# Patient Record
Sex: FEMALE | Race: WHITE | Employment: OTHER | URBAN - METROPOLITAN AREA
[De-identification: names, ages, dates, MRNs, and addresses within clinical notes are randomized per-mention and may not be internally consistent; named-entity substitution may affect disease eponyms.]

---

## 2017-02-28 ENCOUNTER — HOSPITAL ENCOUNTER (OUTPATIENT)
Dept: ULTRASOUND IMAGING | Age: 77
Discharge: HOME | End: 2017-02-28
Attending: FAMILY MEDICINE
Payer: MEDICARE

## 2017-02-28 ENCOUNTER — HOSPITAL ENCOUNTER (OUTPATIENT)
Dept: NUCLEAR MEDICINE | Age: 77
Discharge: HOME | End: 2017-02-28
Attending: FAMILY MEDICINE
Payer: MEDICARE

## 2017-02-28 VITALS — WEIGHT: 168.6 LBS | BODY MASS INDEX: 29.4 KG/M2

## 2017-02-28 DIAGNOSIS — K80.20 GALLSTONE: ICD-10-CM

## 2017-02-28 DIAGNOSIS — R10.9 ABDOMINAL PAIN: ICD-10-CM

## 2017-02-28 PROCEDURE — 78227 HEPATOBIL SYST IMAGE W/DRUG: CPT

## 2017-02-28 PROCEDURE — 74011000250 HC RX REV CODE- 250: Performed by: FAMILY MEDICINE

## 2017-02-28 PROCEDURE — 76705 ECHO EXAM OF ABDOMEN: CPT

## 2017-02-28 PROCEDURE — 74011250636 HC RX REV CODE- 250/636: Performed by: FAMILY MEDICINE

## 2017-02-28 RX ORDER — WATER FOR INJECTION,STERILE
VIAL (ML) INJECTION
Status: COMPLETED | OUTPATIENT
Start: 2017-02-28 | End: 2017-02-28

## 2017-02-28 RX ADMIN — SINCALIDE 1.53 MCG: 5 INJECTION, POWDER, LYOPHILIZED, FOR SOLUTION INTRAVENOUS at 11:20

## 2017-02-28 RX ADMIN — WATER 10 ML: 1 INJECTION INTRAMUSCULAR; INTRAVENOUS; SUBCUTANEOUS at 11:21

## 2017-04-24 ENCOUNTER — OFFICE VISIT (OUTPATIENT)
Dept: SURGERY | Age: 77
End: 2017-04-24

## 2017-04-24 VITALS
DIASTOLIC BLOOD PRESSURE: 68 MMHG | BODY MASS INDEX: 28.85 KG/M2 | OXYGEN SATURATION: 97 % | RESPIRATION RATE: 18 BRPM | TEMPERATURE: 98.5 F | HEIGHT: 64 IN | HEART RATE: 88 BPM | SYSTOLIC BLOOD PRESSURE: 140 MMHG | WEIGHT: 169 LBS

## 2017-04-24 DIAGNOSIS — K80.20 GALLSTONES: ICD-10-CM

## 2017-04-24 DIAGNOSIS — R10.11 RIGHT UPPER QUADRANT ABDOMINAL PAIN: Primary | ICD-10-CM

## 2017-04-24 NOTE — LETTER
4/24/2017 11:29 AM 
 
Patient:  Dana Collins YOB: 1940 Date of Visit: 4/24/2017 Jesse Montesinos MD 
[address] [place] [address],[address] [place] [id number] VIA Facsimile: [phone] Dear Jesse Montesinos MD, Thank you for referring Ms. Dana Collins to [place] [address] for evaluation and treatment. Below are the relevant portions of my assessment and plan of care. Thank you very much for your referral of Ms. Dana Collins. If you have questions, please do not hesitate to call me. I look forward to following Ms. Judi Phipps along with you and will keep you updated as to her progress. Sincerely, Amira Hyman MD

## 2017-04-24 NOTE — PATIENT INSTRUCTIONS
If you have any questions or concerns about today's appointment, the verbal and/or written instructions you were given for follow up care, please call our office at [phone].     [facility] - [address]    [phone] office  [phone]

## 2017-04-24 NOTE — PROGRESS NOTES
General Surgery Consult      Miles Patel  Admit date: (Not on file)    MRN: [id number]     : 1940     Age: 68 y.o. Attending Physician: Sharda Quintero MD, FACS      Subjective:     Sudha Lubin is a 68 y.o. female with a history of abdominal pain. She complains of right upper quadrant pain. The pain happened last night and it was associated with nausea and vomiting. She had a HIDA scan that showed EF of 87% and an U/S that showed gallstones. The patient  has not had jaundice, acholic stools or dark urine and has not had a history of pancreatitis or hepatitis. She never had surgery in the past.     Patient Active Problem List    Diagnosis Date Noted    Encounter for colonoscopy due to history of adenomatous colonic polyps 2015    Hypertension     Thyroid dysfunction     Hypothyroidism     Hematuria      Past Medical History:   Diagnosis Date    Hematuria     Hypertension     Hypothyroidism     Thyroid dysfunction       No past surgical history on file. Social History   Substance Use Topics    Smoking status: Never Smoker    Smokeless tobacco: Not on file    Alcohol use No      History   Smoking Status    Never Smoker   Smokeless Tobacco    Not on file     Family History   Problem Relation Age of Onset    No Known Problems Mother     No Known Problems Father       Current Outpatient Prescriptions   Medication Sig    levothyroxine (SYNTHROID) 75 mcg tablet Take  by mouth Daily (before breakfast).  atorvastatin (LIPITOR) 20 mg tablet Take  by mouth daily.  lisinopril (PRINIVIL, ZESTRIL) 10 mg tablet Take  by mouth daily.  LACTOBACILLUS ACIDOPHILUS (PROBIOTIC PO) Take  by mouth.  DOCOSAHEXANOIC ACID/EPA (FISH OIL PO) Take  by mouth.  melatonin 3 mg tablet Take  by mouth. No current facility-administered medications for this visit.        No Known Allergies     Review of Systems:  Constitutional: negative  Eyes: negative  Ears, Nose, Mouth, Throat, and Face: negative  Respiratory: negative  Cardiovascular: negative  Gastrointestinal: positive for nausea, vomiting and abdominal pain  Genitourinary:negative  Integument/Breast: negative  Hematologic/Lymphatic: negative  Musculoskeletal:negative  Neurological: negative  Behavioral/Psychiatric: negative    Objective:     Visit Vitals    /68 (BP 1 Location: Right arm, BP Patient Position: Sitting)    Pulse 88    Temp 98.5 °F (36.9 °C) (Oral)    Resp 18    Ht 5' 3.5\" (1.613 m)    Wt 76.7 kg (169 lb)    SpO2 97%    BMI 29.47 kg/m2       Physical Exam:      General:  in no apparent distress and well developed and well nourished   Eyes:  conjunctivae and sclerae normal, pupils equal, round, reactive to light   Throat & Neck: no erythema or exudates noted and neck supple and symmetrical; no palpable masses   Lungs:   clear to auscultation bilaterally   Heart:  Regular rate and rhythm   Abdomen:   rounded, soft, non tender except for right upper quadrant tenderenss with localized guarding (postive Goldsmith's sign), nondistended, no masses or organomegaly. Possible small umbilical hernia.     Extremities: extremities normal, atraumatic, no cyanosis or edema   Skin: Normal.         Imaging and Lab Review:     CBC:   Lab Results   Component Value Date/Time    WBC 3.7 06/28/2010 10:06 AM    RBC 5.03 06/28/2010 10:06 AM    HGB 15.3 06/28/2010 10:06 AM    HCT 46.7 06/28/2010 10:06 AM    PLATELET 649 94/54/1138 10:06 AM     BMP:   Lab Results   Component Value Date/Time    Glucose 109 06/28/2010 10:06 AM    Sodium 142 06/28/2010 10:06 AM    Potassium 4.3 06/28/2010 10:06 AM    Chloride 102 06/28/2010 10:06 AM    CO2 26 06/28/2010 10:06 AM    BUN 15 06/28/2010 10:06 AM    Creatinine 0.9 06/28/2010 10:06 AM    Calcium 10.2 06/28/2010 10:06 AM     CMP:  Lab Results   Component Value Date/Time    Glucose 109 06/28/2010 10:06 AM    Sodium 142 06/28/2010 10:06 AM    Potassium 4.3 06/28/2010 10:06 AM    Chloride 102 06/28/2010 10:06 AM    CO2 26 06/28/2010 10:06 AM    BUN 15 06/28/2010 10:06 AM    Creatinine 0.9 06/28/2010 10:06 AM    Calcium 10.2 06/28/2010 10:06 AM    Anion gap 14 06/28/2010 10:06 AM    BUN/Creatinine ratio 17 06/28/2010 10:06 AM    Alk. phosphatase 75 06/28/2010 10:06 AM    Protein, total 8.0 06/28/2010 10:06 AM    Albumin 4.6 06/28/2010 10:06 AM    Globulin 3.4 06/28/2010 10:06 AM    A-G Ratio 1.4 06/28/2010 10:06 AM       No results found for this or any previous visit (from the past 24 hour(s)). images and reports reviewed    Assessment:   Henrique Duran is a 68 y.o. female is presenting with abdominal pain and a picture of chronic cholecystitis. I explained the indications for robotic cholecystectomy as well as the alternatives. I discussed the potential risks, including but not limited to bleeding, wound infection, trocar injuries, bile duct injury and leak, and also the possible need for conversion to open procedure. I also explained the firefly technology and how it allow us to visualize the biliary tree to avoid bile duct injury or leak. She indicates that she understands the risks, but the patient wants to hold on the surgery because her son will be undergoing heart surgery this week. I explained to her the chance of acute cholecystitis and the risk of major complications. Plan:      Will schedule for robotic single-site cholecystectomy with firefly (ICG) technology for identification of the biliary tree once the patient is ready for the surgery  Follow up in 1 to 2 months     Please call me if you have any questions (cell phone: [phone])     Signed By: Iris Ramos MD     April 24, 2017

## 2017-04-24 NOTE — PROGRESS NOTES
Patient is a 68 y.o. female who presents for a surgical evaluation of RUQ abdominal pain, which she scores as a 10/10 today, & cholelithiasis.

## 2017-04-24 NOTE — MR AVS SNAPSHOT
Visit Information Date & Time Provider Department Dept. Phone Encounter #  
 4/24/2017 11:30 AM Mely Gaytan [address] [facility] [phone] [id number] Upcoming Health Maintenance Date Due DTaP/Tdap/Td series (1 - Tdap) 10/27/1961 ZOSTER VACCINE AGE 60> 10/27/2000 GLAUCOMA SCREENING Q2Y 10/27/2005 Pneumococcal 65+ Low/Medium Risk (1 of 2 - PCV13) 10/27/2005 MEDICARE YEARLY EXAM 10/27/2005 INFLUENZA AGE 9 TO ADULT 8/1/2016 Allergies as of 4/24/2017  Review Complete On: 4/24/2017 By: Marycruz Dougherty No Known Allergies Current Immunizations  Never Reviewed No immunizations on file. Not reviewed this visit Vitals BP Pulse Temp Resp Height(growth percentile) Weight(growth percentile) 140/68 (BP 1 Location: Right arm, BP Patient Position: Sitting) 88 98.5 °F (36.9 °C) (Oral) 18 5' 3.5\" (1.613 m) 169 lb (76.7 kg) SpO2 BMI OB Status Smoking Status 97% 29.47 kg/m2 Menopause Never Smoker BMI and BSA Data Body Mass Index Body Surface Area  
 29.47 kg/m 2 1.85 m 2 Preferred Pharmacy Pharmacy Name Phone [address] [place] [address] [phone] Your Updated Medication List  
  
   
This list is accurate as of: 4/24/17 11:55 AM.  Always use your most recent med list.  
  
  
  
  
 atorvastatin 20 mg tablet Commonly known as:  LIPITOR Take  by mouth daily. FISH OIL PO Take  by mouth.  
  
 levothyroxine 75 mcg tablet Commonly known as:  SYNTHROID Take  by mouth Daily (before breakfast). lisinopril 10 mg tablet Commonly known as:  Emi Feil Take  by mouth daily. melatonin 3 mg tablet Take  by mouth. PROBIOTIC PO Take  by mouth. Patient Instructions If you have any questions or concerns about today's appointment, the verbal and/or written instructions you were given for follow up care, please call our office at [phone]. [facility] - [place] [id number] [place] [address], [address] 
 
[phone] office [phone] Introducing [facility] & HEALTH SERVICES! [facility] introduces [employer] patient portal. Now you can access parts of your medical record, email your doctor's office, and request medication refills online. 1. In your internet browser, go to [email or web address] 2. Click on the First Time User? Click Here link in the Sign In box. You will see the New Member Sign Up page. 3. Enter your Fantoo Access Code exactly as it appears below. You will not need to use this code after youve completed the sign-up process. If you do not sign up before the expiration date, you must request a new code. · [employer] Access Code: [email or web address] Expires: 5/23/2017  9:57 AM 
 
4. Enter the last four digits of your Social Security Number (xxxx) and Date of Birth (mm/dd/yyyy) as indicated and click Submit. You will be taken to the next sign-up page. 5. Create a Fantoo ID. This will be your Fantoo login ID and cannot be changed, so think of one that is secure and easy to remember. 6. Create a [employer] password. You can change your password at any time. 7. Enter your Password Reset Question and Answer. This can be used at a later time if you forget your password. 8. Enter your e-mail address. You will receive e-mail notification when new information is available in [address] 9. Click Sign Up. You can now view and download portions of your medical record. 10. Click the Download Summary menu link to download a portable copy of your medical information. If you have questions, please visit the Frequently Asked Questions section of the [employer] website. Remember, [employer]oo is NOT to be used for urgent needs. For medical emergencies, dial 911. Now available from your iPhone and Android! Please provide this summary of care documentation to your next provider. Your primary care clinician is listed as JAYRO Meyers. If you have any questions after today's visit, please call [phone].

## 2017-04-26 ENCOUNTER — DOCUMENTATION ONLY (OUTPATIENT)
Dept: SURGERY | Age: 77
End: 2017-04-26

## 2017-04-26 ENCOUNTER — TELEPHONE (OUTPATIENT)
Dept: SURGERY | Age: 77
End: 2017-04-26

## 2017-04-26 NOTE — PROGRESS NOTES
.. PATIENT PRE AND POST OPERATIVE INSTRUCTIONS     [address] [place] [address]     Before Surgery Instructions:   1) You must have someone available to drive you to and from your procedure and stay with you for the first 24 hours. 2) It is very important that you have nothing to eat or drink after midnight the night before your surgery. This includes chewing gum or sucking on hard candy. Take only heart, blood pressure and cholesterol medications the morning of surgery with only a sip of water. 3) Please stop taking Plavix 10 days prior to your surgery. Stop taking Coumadin 5 days prior to your surgery. Stop taking all Aspirin or Aspirin containing products 7 days prior to your surgery. Stop taking Advil, Motrin, Aleve, and etc. 3 days prior to your surgery. 4) If you take any diabetic medications please consult with your primary care physician on how to take them on the day of your surgery  5) Please stop all Herbal products 2 weeks prior to your surgery. 6) Please arrive at the hospital 1 ½ hours prior to your surgery, unless you have been otherwise instructed. 7) Patients having an operation on their colon will be given a separate instruction sheet on their Bowel Prep. 8) For any pre-operative work up check in at the main entrance to [address] [place] [address], and then go to Patient Registration. These studies are done on a walk in basis they are open from 7:00am to 5:00pm Monday through Friday. 9) Please wash your surgical site the morning of your surgery with soap and water. 10) If you are of child bearing age you will have pregnancy test done the morning of your surgery as soon as you arrive. After Surgery Instructions: You will need to be seen in the office for a follow-up visit 7-14 days after your surgery. Please call after you have had the procedure to make this appointment. Unless otherwise instructed, you may remove your outer bandage and shower 48 hours after your surgery.   If you develop a fever greater than 101, have any significant drainage, bleeding, swelling and/or pus of the wound. Please call our office immediately. Surgery Date and Time:  (patient to call back)    Please check in at [facility], enter through the Emergency Room entrance and go up to the second floor. Please check in by       the day of your surgery. You may contact Francesco Ornelas with any questions at [phone].

## 2017-04-27 RX ORDER — HYDROCODONE BITARTRATE AND ACETAMINOPHEN 5; 325 MG/1; MG/1
TABLET ORAL
COMMUNITY

## 2017-05-01 ENCOUNTER — ANESTHESIA EVENT (OUTPATIENT)
Dept: SURGERY | Age: 77
End: 2017-05-01
Payer: MEDICARE

## 2017-05-02 ENCOUNTER — ANESTHESIA (OUTPATIENT)
Dept: SURGERY | Age: 77
End: 2017-05-02
Payer: MEDICARE

## 2017-05-02 ENCOUNTER — HOSPITAL ENCOUNTER (OUTPATIENT)
Age: 77
Setting detail: OUTPATIENT SURGERY
Discharge: HOME | End: 2017-05-02
Attending: SURGERY | Admitting: SURGERY
Payer: MEDICARE

## 2017-05-02 VITALS
BODY MASS INDEX: 26.63 KG/M2 | DIASTOLIC BLOOD PRESSURE: 60 MMHG | HEART RATE: 51 BPM | RESPIRATION RATE: 16 BRPM | SYSTOLIC BLOOD PRESSURE: 128 MMHG | WEIGHT: 156 LBS | OXYGEN SATURATION: 92 % | HEIGHT: 64 IN | TEMPERATURE: 97.9 F

## 2017-05-02 PROCEDURE — 74011000250 HC RX REV CODE- 250: Performed by: SURGERY

## 2017-05-02 PROCEDURE — 77030018842 HC SOL IRR SOD CL 9% BAXT -A: Performed by: SURGERY

## 2017-05-02 PROCEDURE — 76210000021 HC REC RM PH II 0.5 TO 1 HR: Performed by: SURGERY

## 2017-05-02 PROCEDURE — 77030011640 HC PAD GRND REM COVD -A: Performed by: SURGERY

## 2017-05-02 PROCEDURE — 74011250636 HC RX REV CODE- 250/636

## 2017-05-02 PROCEDURE — 76010000875 HC OR TIME 1.5 TO 2HR INTENSV - TIER 2: Performed by: SURGERY

## 2017-05-02 PROCEDURE — 74011250636 HC RX REV CODE- 250/636: Performed by: SURGERY

## 2017-05-02 PROCEDURE — 74011000250 HC RX REV CODE- 250

## 2017-05-02 PROCEDURE — 77030029216 HC PRT SGL SITE DAVINCI INTU -C: Performed by: SURGERY

## 2017-05-02 PROCEDURE — 77030035048 HC TRCR ENDOSC OPTCL COVD -B: Performed by: SURGERY

## 2017-05-02 PROCEDURE — 77030010939 HC CLP LIG TELE -B: Performed by: SURGERY

## 2017-05-02 PROCEDURE — 77030008477 HC STYL SATN SLP COVD -A: Performed by: ANESTHESIOLOGY

## 2017-05-02 PROCEDURE — 74011000272 HC RX REV CODE- 272: Performed by: SURGERY

## 2017-05-02 PROCEDURE — 77030002933 HC SUT MCRYL J&J -A: Performed by: SURGERY

## 2017-05-02 PROCEDURE — 74011250636 HC RX REV CODE- 250/636: Performed by: NURSE ANESTHETIST, CERTIFIED REGISTERED

## 2017-05-02 PROCEDURE — 77030008683 HC TU ET CUF COVD -A: Performed by: ANESTHESIOLOGY

## 2017-05-02 PROCEDURE — 77030032490 HC SLV COMPR SCD KNE COVD -B: Performed by: SURGERY

## 2017-05-02 PROCEDURE — 74011250637 HC RX REV CODE- 250/637: Performed by: NURSE ANESTHETIST, CERTIFIED REGISTERED

## 2017-05-02 PROCEDURE — 93005 ELECTROCARDIOGRAM TRACING: CPT

## 2017-05-02 PROCEDURE — 77030002966 HC SUT PDS J&J -A: Performed by: SURGERY

## 2017-05-02 PROCEDURE — 77030020703 HC SEAL CANN DISP INTU -B: Performed by: SURGERY

## 2017-05-02 PROCEDURE — 76060000034 HC ANESTHESIA 1.5 TO 2 HR: Performed by: SURGERY

## 2017-05-02 PROCEDURE — 77030008771 HC TU NG SALEM SUMP -A: Performed by: ANESTHESIOLOGY

## 2017-05-02 PROCEDURE — 88304 TISSUE EXAM BY PATHOLOGIST: CPT | Performed by: SURGERY

## 2017-05-02 PROCEDURE — 77030011267 HC ELECTRD BLD COVD -A: Performed by: SURGERY

## 2017-05-02 PROCEDURE — 77030020263 HC SOL INJ SOD CL0.9% LFCR 1000ML: Performed by: SURGERY

## 2017-05-02 PROCEDURE — 82947 ASSAY GLUCOSE BLOOD QUANT: CPT

## 2017-05-02 PROCEDURE — 76210000016 HC OR PH I REC 1 TO 1.5 HR: Performed by: SURGERY

## 2017-05-02 RX ORDER — LIDOCAINE HYDROCHLORIDE 20 MG/ML
INJECTION, SOLUTION EPIDURAL; INFILTRATION; INTRACAUDAL; PERINEURAL AS NEEDED
Status: DISCONTINUED | OUTPATIENT
Start: 2017-05-02 | End: 2017-05-02 | Stop reason: HOSPADM

## 2017-05-02 RX ORDER — PROPOFOL 10 MG/ML
INJECTION, EMULSION INTRAVENOUS AS NEEDED
Status: DISCONTINUED | OUTPATIENT
Start: 2017-05-02 | End: 2017-05-02 | Stop reason: HOSPADM

## 2017-05-02 RX ORDER — ONDANSETRON 2 MG/ML
4 INJECTION INTRAMUSCULAR; INTRAVENOUS ONCE
Status: DISCONTINUED | OUTPATIENT
Start: 2017-05-02 | End: 2017-05-02 | Stop reason: HOSPADM

## 2017-05-02 RX ORDER — GLYCOPYRROLATE 0.2 MG/ML
INJECTION INTRAMUSCULAR; INTRAVENOUS AS NEEDED
Status: DISCONTINUED | OUTPATIENT
Start: 2017-05-02 | End: 2017-05-02 | Stop reason: HOSPADM

## 2017-05-02 RX ORDER — SUCCINYLCHOLINE CHLORIDE 20 MG/ML
INJECTION INTRAMUSCULAR; INTRAVENOUS AS NEEDED
Status: DISCONTINUED | OUTPATIENT
Start: 2017-05-02 | End: 2017-05-02 | Stop reason: HOSPADM

## 2017-05-02 RX ORDER — ROCURONIUM BROMIDE 10 MG/ML
INJECTION, SOLUTION INTRAVENOUS AS NEEDED
Status: DISCONTINUED | OUTPATIENT
Start: 2017-05-02 | End: 2017-05-02 | Stop reason: HOSPADM

## 2017-05-02 RX ORDER — SODIUM CHLORIDE, SODIUM LACTATE, POTASSIUM CHLORIDE, CALCIUM CHLORIDE 600; 310; 30; 20 MG/100ML; MG/100ML; MG/100ML; MG/100ML
75 INJECTION, SOLUTION INTRAVENOUS CONTINUOUS
Status: DISCONTINUED | OUTPATIENT
Start: 2017-05-02 | End: 2017-05-02 | Stop reason: HOSPADM

## 2017-05-02 RX ORDER — SODIUM CHLORIDE, SODIUM LACTATE, POTASSIUM CHLORIDE, CALCIUM CHLORIDE 600; 310; 30; 20 MG/100ML; MG/100ML; MG/100ML; MG/100ML
50 INJECTION, SOLUTION INTRAVENOUS CONTINUOUS
Status: DISCONTINUED | OUTPATIENT
Start: 2017-05-02 | End: 2017-05-02 | Stop reason: HOSPADM

## 2017-05-02 RX ORDER — FAMOTIDINE 20 MG/1
20 TABLET, FILM COATED ORAL ONCE
Status: COMPLETED | OUTPATIENT
Start: 2017-05-02 | End: 2017-05-02

## 2017-05-02 RX ORDER — NEOSTIGMINE METHYLSULFATE 5 MG/5 ML
SYRINGE (ML) INTRAVENOUS AS NEEDED
Status: DISCONTINUED | OUTPATIENT
Start: 2017-05-02 | End: 2017-05-02 | Stop reason: HOSPADM

## 2017-05-02 RX ORDER — FENTANYL CITRATE 50 UG/ML
25 INJECTION, SOLUTION INTRAMUSCULAR; INTRAVENOUS AS NEEDED
Status: DISCONTINUED | OUTPATIENT
Start: 2017-05-02 | End: 2017-05-02 | Stop reason: HOSPADM

## 2017-05-02 RX ORDER — ONDANSETRON 2 MG/ML
INJECTION INTRAMUSCULAR; INTRAVENOUS AS NEEDED
Status: DISCONTINUED | OUTPATIENT
Start: 2017-05-02 | End: 2017-05-02 | Stop reason: HOSPADM

## 2017-05-02 RX ORDER — MIDAZOLAM HYDROCHLORIDE 1 MG/ML
INJECTION, SOLUTION INTRAMUSCULAR; INTRAVENOUS AS NEEDED
Status: DISCONTINUED | OUTPATIENT
Start: 2017-05-02 | End: 2017-05-02 | Stop reason: HOSPADM

## 2017-05-02 RX ORDER — OXYCODONE AND ACETAMINOPHEN 5; 325 MG/1; MG/1
1 TABLET ORAL AS NEEDED
Status: DISCONTINUED | OUTPATIENT
Start: 2017-05-02 | End: 2017-05-02 | Stop reason: HOSPADM

## 2017-05-02 RX ORDER — FENTANYL CITRATE 50 UG/ML
INJECTION, SOLUTION INTRAMUSCULAR; INTRAVENOUS AS NEEDED
Status: DISCONTINUED | OUTPATIENT
Start: 2017-05-02 | End: 2017-05-02 | Stop reason: HOSPADM

## 2017-05-02 RX ORDER — DEXAMETHASONE SODIUM PHOSPHATE 4 MG/ML
INJECTION, SOLUTION INTRA-ARTICULAR; INTRALESIONAL; INTRAMUSCULAR; INTRAVENOUS; SOFT TISSUE AS NEEDED
Status: DISCONTINUED | OUTPATIENT
Start: 2017-05-02 | End: 2017-05-02 | Stop reason: HOSPADM

## 2017-05-02 RX ORDER — CEFAZOLIN SODIUM 2 G/50ML
2 SOLUTION INTRAVENOUS
Status: COMPLETED | OUTPATIENT
Start: 2017-05-02 | End: 2017-05-02

## 2017-05-02 RX ORDER — CEPHALEXIN 750 MG/1
750 CAPSULE ORAL 3 TIMES DAILY
Qty: 30 CAP | Refills: 0 | Status: SHIPPED | OUTPATIENT
Start: 2017-05-02

## 2017-05-02 RX ORDER — HYDROMORPHONE HYDROCHLORIDE 1 MG/ML
INJECTION, SOLUTION INTRAMUSCULAR; INTRAVENOUS; SUBCUTANEOUS AS NEEDED
Status: DISCONTINUED | OUTPATIENT
Start: 2017-05-02 | End: 2017-05-02 | Stop reason: HOSPADM

## 2017-05-02 RX ORDER — HYDROMORPHONE HYDROCHLORIDE 2 MG/ML
0.5 INJECTION, SOLUTION INTRAMUSCULAR; INTRAVENOUS; SUBCUTANEOUS
Status: DISCONTINUED | OUTPATIENT
Start: 2017-05-02 | End: 2017-05-02 | Stop reason: HOSPADM

## 2017-05-02 RX ORDER — OXYCODONE AND ACETAMINOPHEN 5; 325 MG/1; MG/1
1 TABLET ORAL
Qty: 24 TAB | Refills: 0 | Status: SHIPPED | OUTPATIENT
Start: 2017-05-02

## 2017-05-02 RX ADMIN — GLYCOPYRROLATE 0.4 MG: 0.2 INJECTION INTRAMUSCULAR; INTRAVENOUS at 14:16

## 2017-05-02 RX ADMIN — ROCURONIUM BROMIDE 5 MG: 10 INJECTION, SOLUTION INTRAVENOUS at 12:47

## 2017-05-02 RX ADMIN — Medication 3 MG: at 14:16

## 2017-05-02 RX ADMIN — DEXAMETHASONE SODIUM PHOSPHATE 4 MG: 4 INJECTION, SOLUTION INTRA-ARTICULAR; INTRALESIONAL; INTRAMUSCULAR; INTRAVENOUS; SOFT TISSUE at 13:06

## 2017-05-02 RX ADMIN — SUCCINYLCHOLINE CHLORIDE 100 MG: 20 INJECTION INTRAMUSCULAR; INTRAVENOUS at 12:47

## 2017-05-02 RX ADMIN — CEFAZOLIN SODIUM 2 G: 2 SOLUTION INTRAVENOUS at 12:47

## 2017-05-02 RX ADMIN — FENTANYL CITRATE 50 MCG: 50 INJECTION, SOLUTION INTRAMUSCULAR; INTRAVENOUS at 13:07

## 2017-05-02 RX ADMIN — HYDROMORPHONE HYDROCHLORIDE 0.5 MG: 1 INJECTION, SOLUTION INTRAMUSCULAR; INTRAVENOUS; SUBCUTANEOUS at 13:31

## 2017-05-02 RX ADMIN — FENTANYL CITRATE 100 MCG: 50 INJECTION, SOLUTION INTRAMUSCULAR; INTRAVENOUS at 12:47

## 2017-05-02 RX ADMIN — PROPOFOL 150 MG: 10 INJECTION, EMULSION INTRAVENOUS at 12:47

## 2017-05-02 RX ADMIN — OXYCODONE HYDROCHLORIDE AND ACETAMINOPHEN 1 TABLET: 5; 325 TABLET ORAL at 16:08

## 2017-05-02 RX ADMIN — ROCURONIUM BROMIDE 10 MG: 10 INJECTION, SOLUTION INTRAVENOUS at 13:02

## 2017-05-02 RX ADMIN — GLYCOPYRROLATE 0.4 MG: 0.2 INJECTION INTRAMUSCULAR; INTRAVENOUS at 12:57

## 2017-05-02 RX ADMIN — MIDAZOLAM HYDROCHLORIDE 2 MG: 1 INJECTION, SOLUTION INTRAMUSCULAR; INTRAVENOUS at 12:42

## 2017-05-02 RX ADMIN — ROCURONIUM BROMIDE 5 MG: 10 INJECTION, SOLUTION INTRAVENOUS at 13:49

## 2017-05-02 RX ADMIN — FENTANYL CITRATE 50 MCG: 50 INJECTION, SOLUTION INTRAMUSCULAR; INTRAVENOUS at 13:05

## 2017-05-02 RX ADMIN — ONDANSETRON 4 MG: 2 INJECTION INTRAMUSCULAR; INTRAVENOUS at 13:07

## 2017-05-02 RX ADMIN — FAMOTIDINE 20 MG: 20 TABLET ORAL at 12:02

## 2017-05-02 RX ADMIN — SODIUM CHLORIDE, SODIUM LACTATE, POTASSIUM CHLORIDE, AND CALCIUM CHLORIDE 75 ML/HR: 600; 310; 30; 20 INJECTION, SOLUTION INTRAVENOUS at 12:03

## 2017-05-02 RX ADMIN — LIDOCAINE HYDROCHLORIDE 50 MG: 20 INJECTION, SOLUTION EPIDURAL; INFILTRATION; INTRACAUDAL; PERINEURAL at 12:47

## 2017-05-02 RX ADMIN — ROCURONIUM BROMIDE 15 MG: 10 INJECTION, SOLUTION INTRAVENOUS at 12:51

## 2017-05-02 NOTE — ANESTHESIA PREPROCEDURE EVALUATION
Anesthetic History   No history of anesthetic complications            Review of Systems / Medical History  Patient summary reviewed and pertinent labs reviewed    Pulmonary  Within defined limits                 Neuro/Psych   Within defined limits           Cardiovascular    Hypertension: well controlled              Exercise tolerance: >4 METS     GI/Hepatic/Renal                Endo/Other      Hypothyroidism: well controlled       Other Findings   Comments:   Risk Factors for Postoperative nausea/vomiting:       History of postoperative nausea/vomiting? NO       Female? YES       Motion sickness? NO       Intended opioid administration for postoperative analgesia? YES      Smoking Abstinence  Current Smoker? NO  Elective Surgery? YES  Seen preoperatively by anesthesiologist or proxy prior to day of surgery? YES  Pt abstained from smoking 24 hours prior to anesthesia?  YES           Physical Exam    Airway  Mallampati: II  TM Distance: 4 - 6 cm  Neck ROM: normal range of motion   Mouth opening: Normal     Cardiovascular    Rhythm: regular  Rate: normal         Dental    Dentition: Poor dentition     Pulmonary  Breath sounds clear to auscultation               Abdominal  GI exam deferred       Other Findings            Anesthetic Plan    ASA: 2  Anesthesia type: general          Induction: Intravenous  Anesthetic plan and risks discussed with: Patient

## 2017-05-02 NOTE — BRIEF OP NOTE
BRIEF OPERATIVE NOTE    Date of Procedure: 5/2/2017   Preoperative Diagnosis: ruq pain r10.11 gallstones k80.20   Postoperative Diagnosis: ruq pain r10.11 gallstones k80.20     Procedure(s):  davinci SINGLE SITE CHOLECYSTECTOMY with firefly ROBOTIC ASSISTED   Surgeon(s) and Role:     * Vashti Dominguez MD - Primary         Assistant Staff:       Surgical Staff:  Circ-1: Noel Schwab RN  Scrub Tech-1: Lidia Allen  Surg Asst-1: Maykel Richardson  Surg Asst-2: George Alvarado  Event Time In   Incision Start 1256   Incision Close 1420     Anesthesia: General   Estimated Blood Loss: 100 cc  Specimens:   ID Type Source Tests Collected by Time Destination   1 : GALLBLADDER Preservative   Vashti Dominguez MD 5/2/2017 1407 Pathology      Findings: acute and chronic cholecystitis with pus.    Complications: none  Implants: * No implants in log *

## 2017-05-02 NOTE — PROGRESS NOTES
Date of Surgery Update:  Ling Child was seen and examined. History and physical has been reviewed. The patient has been examined. There have been no significant clinical changes since the completion of the originally dated History and Physical. Will proceed with robotic single-site cholecystectomy with firefly.      Signed By: Luciana Worrell MD     May 2, 2017 12:15 PM

## 2017-05-02 NOTE — DISCHARGE INSTRUCTIONS
Instructions Following Ambulatory Surgery    Patient: Sam Meredith MRN: [id number]  SSN: [id number]    YOB: 1940  Age: 68 y.o. Sex: female      Activity  · As tolerated, walking encourage, stairs are okay  · Avoid strenuous activities - no lifting anything heavier than 15 pounds. · You may shower at home after 48 hours. Diet  · Regular diet after nausea from the anesthetic has passed. Pain  · Take pain medication as directed by your doctor  ·  Call your doctor if pain is NOT relieved by medication    Dressing Care  · Remove outer dressing (if any) after 48 hours. Leave steri-strips (if any) in place until they fall off. After Anesthesia  · For the first 24 hours: DO NOT Drive, Drink alcoholic beverages, or Make important decisions  · Be aware of dizziness following anesthesia and while taking pain medication    Call your doctor if  · Excessive bleeding that does not stop after holding mild pressure over the area  · Temperature of 101 degrees F or above  · Redness,excessive swelling or bruising, and/or green or yellow, smelly discharge from incision  · If nausea and vomiting continues    Follow-Up Phone Calls  · Call the office at [phone] to make your follow-up appointment    Appointment date/time: 2 weeks after the surgery. Dr. Yolanda Canales cell phone number is [phone]. Please call me if you have any concerns or questions.        DISCHARGE SUMMARY from Nurse    The following personal items are in your possession at time of discharge:    Dental Appliances: None  Visual Aid: Glasses                            PATIENT INSTRUCTIONS:    After general anesthesia or intravenous sedation, for 24 hours or while taking prescription Narcotics:  · Limit your activities  · Do not drive and operate hazardous machinery  · Do not make important personal or business decisions  · Do  not drink alcoholic beverages  · If you have not urinated within 8 hours after discharge, please contact your surgeon on call. Report the following to your surgeon:  · Excessive pain, swelling, redness or odor of or around the surgical area  · Temperature over 100.5  · Nausea and vomiting lasting longer than 4 hours or if unable to take medications  · Any signs of decreased circulation or nerve impairment to extremity: change in color, persistent  numbness, tingling, coldness or increase pain  · Any questions        What to do at Home:  Recommended activity: Activity as tolerated and no driving for today. *  Please give a list of your current medications to your Primary Care Provider. *  Please update this list whenever your medications are discontinued, doses are      changed, or new medications (including over-the-counter products) are added. *  Please carry medication information at all times in case of emergency situations. These are general instructions for a healthy lifestyle:    No smoking/ No tobacco products/ Avoid exposure to second hand smoke    Surgeon General's Warning:  Quitting smoking now greatly reduces serious risk to your health. Obesity, smoking, and sedentary lifestyle greatly increases your risk for illness    A healthy diet, regular physical exercise & weight monitoring are important for maintaining a healthy lifestyle    You may be retaining fluid if you have a history of heart failure or if you experience any of the following symptoms:  Weight gain of 3 pounds or more overnight or 5 pounds in a week, increased swelling in our hands or feet or shortness of breath while lying flat in bed. Please call your doctor as soon as you notice any of these symptoms; do not wait until your next office visit.     Recognize signs and symptoms of STROKE:    F-face looks uneven    A-arms unable to move or move unevenly    S-speech slurred or non-existent    T-time-call 911 as soon as signs and symptoms begin-DO NOT go       Back to bed or wait to see if you get better-TIME IS BRAIN. Warning Signs of HEART ATTACK     Call 911 if you have these symptoms:   Chest discomfort. Most heart attacks involve discomfort in the center of the chest that lasts more than a few minutes, or that goes away and comes back. It can feel like uncomfortable pressure, squeezing, fullness, or pain.  Discomfort in other areas of the upper body. Symptoms can include pain or discomfort in one or both arms, the back, neck, jaw, or stomach.  Shortness of breath with or without chest discomfort.  Other signs may include breaking out in a cold sweat, nausea, or lightheadedness. Don't wait more than five minutes to call 911 - MINUTES MATTER! Fast action can save your life. Calling 911 is almost always the fastest way to get lifesaving treatment. Emergency Medical Services staff can begin treatment when they arrive -- up to an hour sooner than if someone gets to the hospital by car. The discharge information has been reviewed with the patient and spouse. The patient and spouse verbalized understanding. Discharge medications reviewed with the patient and spouse and appropriate educational materials and side effects teaching were provided. Patient armband removed and given to patient to take home.   Patient was informed of the privacy risks if armband lost or stolen

## 2017-05-02 NOTE — ANESTHESIA POSTPROCEDURE EVALUATION
Post-Anesthesia Evaluation and Assessment    Patient: Aylin Juarez MRN: [id number]  SSN: [id number]    YOB: 1940  Age: 68 y.o. Sex: female       Cardiovascular Function/Vital Signs  Visit Vitals    /58 (BP 1 Location: Right arm, BP Patient Position: At rest;Supine)    Pulse (!) 52    Temp 36.6 °C (97.9 °F)    Resp 12    Ht 5' 3.5\" (1.613 m)    Wt 70.8 kg (156 lb)    SpO2 91%    BMI 27.2 kg/m2       Patient is status post general anesthesia for Procedure(s):  davinci SINGLE SITE CHOLECYSTECTOMY with firefly ROBOTIC ASSISTED . Nausea/Vomiting: None    Postoperative hydration reviewed and adequate. Pain:  Pain Scale 1: Numeric (0 - 10) (05/02/17 1525)  Pain Intensity 1: 0 (05/02/17 1525)   Managed    Neurological Status:   Neuro (WDL): Exceptions to WDL (drowsy - post anesthesia) (05/02/17 1439)  Neuro  LUE Motor Response: Purposeful (05/02/17 1439)  LLE Motor Response: Purposeful (05/02/17 1439)  RUE Motor Response: Purposeful (05/02/17 1439)  RLE Motor Response: Purposeful (05/02/17 1439)   At baseline    Mental Status and Level of Consciousness: Arousable    Pulmonary Status:   O2 Device: Nasal cannula (05/02/17 1520)   Adequate oxygenation and airway patent    Complications related to anesthesia: None    Post-anesthesia assessment completed.  No concerns    Signed By: Jackson Bello MD     May 2, 2017

## 2017-05-02 NOTE — OP NOTES
Kelechi Cabrera    Name:  Romero Blackman  MR#:  [id number]  :  1940  Account #:  [de-identified]  Date of Adm:  2017  Date of Surgery:  2017      SURGEON: Chris Hernandez MD    PREOPERATIVE DIAGNOSIS: Chronic cholecystitis. POSTOPERATIVE DIAGNOSIS: Chronic and acute cholecystitis. PROCEDURES PERFORMED: Robotic Single-Site cholecystectomy  with Firefly to identify the biliary tree. ANESTHESIA: General.    COMPLICATIONS: None. SPECIMENS REMOVED: Gallbladder. ESTIMATED BLOOD LOSS: 100 mL. DESCRIPTION OF PROCEDURE: The patient was brought to the  operating room, anesthesia was induced. Scrubbing and draping of the  abdomen was done in the usual manner in the usual manner. A time-  out was performed. A skin incision inside the umbilicus was performed. Veress needle was inserted. Abdomen was insufflated. The fascia was  opened, the abdomen was entered. The robot was docked. At this  point, there was omentum stuck on the gallbladder and this omentum  was dissected gently, we able to identify the gallbladder, but there was  pus coming out from the dome of the gallbladder secondary to the  acute and chronic inflammation. The gallbladder was very thick, so we  suctioned the gallbladder, and we were able to pull it superiorly. There  was significant adhesion between the gallbladder and the omentum. These were dissected bluntly making sure that we worked our way  down on the gallbladder wall to prevent any injury to the colon or  duodenum. At this point, dissection was continued all the way towards  the cystic duct. Firefly was used to identify the cystic duct going into  the gallbladder. It was dissected and it was clipped and divided. At this  point, the gallbladder was taken out using suction irrigation and cautery  from the gallbladder wall secondary to the severe inflammation, and  there was multiple pus coming from the gallbladder itself.  At this point,  the gallbladder was removed completely from the liver bed. Multiple  suction and irrigation was performed in the area. At this point, I made  the decision not to place a HAIM drain because the area was cleaned  after the suctioning again and there was good hemostasis. At this  point, the gallbladder and the single port were taken out through the  single incision. The fascia was closed with a #1 PDS figure-of-eight  suture, and the skin was closed with 4-0 Monocryl. Sterile dressing  was applied.         MD LISA Green / CARMEN  D:  05/02/2017   14:32  T:  05/02/2017   19:32  Job #:  [id number]

## 2017-05-02 NOTE — IP AVS SNAPSHOT
[place] 
 
 
 [address] 
[phone] Patient: Ling Marrero MRN: [id number] BOP:[id number] You are allergic to the following Allergen Reactions Hydrochlorothiazide Unknown (comments) Recent Documentation Height Weight BMI OB Status Smoking Status 1.613 m 70.8 kg 27.2 kg/m2 Menopause Never Smoker Emergency Contacts Name Discharge Info Relation Home Work Mobile Jose Francisco Contreras DISCHARGE CAREGIVER [3] Spouse [3] [phone] About your hospitalization You were admitted on:  May 2, 2017 [facility] on:  May 2, 2017 Unit phone number:  [phone] Why you were hospitalized Your primary diagnosis was:  Not on File Providers Seen During Your Hospitalizations Provider Role Specialty Primary office phone Luciana Worrell MD Attending Provider Surgery [phone] Your Primary Care Physician (PCP) Primary Care Physician Office Phone Office Fax Iqra Pawnee [phone] Follow-up Information Follow up With Details Comments Contact Info Dorita Kelley MD   [address] [place] [phone] Your Appointments Monday May 15, 2017 10:00 AM EDT  
POST OP with Luciana Worrell MD  
[address] [place] [address] [id number] [place] [address] [phone] Current Discharge Medication List  
  
START taking these medications Dose & Instructions Dispensing Information Comments Morning Noon Evening Bedtime  
 cephalexin 750 mg capsule Commonly known as:  Kevin García Your last dose was: Your next dose is:    
   
   
 Dose:  750 mg Take 1 Cap by mouth three (3) times daily. Quantity:  30 Cap Refills:  0 oxyCODONE-acetaminophen 5-325 mg per tablet Commonly known as:  PERCOCET Your last dose was: Your next dose is:    
   
   
 Dose:  1 Tab Take 1 Tab by mouth every four (4) hours as needed for Pain. Max Daily Amount: 6 Tabs. Quantity:  24 Tab Refills:  0 CONTINUE these medications which have NOT CHANGED Dose & Instructions Dispensing Information Comments Morning Noon Evening Bedtime  
 atorvastatin 20 mg tablet Commonly known as:  LIPITOR Your last dose was: Your next dose is: Take  by mouth daily. Refills:  0  
     
   
   
   
  
 FISH OIL PO Your last dose was: Your next dose is: Take  by mouth. Refills:  0 HYDROcodone-acetaminophen 5-325 mg per tablet Commonly known as:  [address] Your last dose was: Your next dose is: Take  by mouth. Refills:  0  
     
   
   
   
  
 levothyroxine 75 mcg tablet Commonly known as:  SYNTHROID Your last dose was: Your next dose is: Take  by mouth Daily (before breakfast). Refills:  0  
     
   
   
   
  
 lisinopril 10 mg tablet Commonly known as:  Tanda Limerick Your last dose was: Your next dose is: Take  by mouth daily. Refills:  0  
     
   
   
   
  
 melatonin 3 mg tablet Your last dose was: Your next dose is: Take  by mouth. Refills:  0 PROBIOTIC PO Your last dose was: Your next dose is: Take  by mouth. Refills:  0 Where to Get Your Medications Information on where to get these meds will be given to you by the nurse or doctor. ! Ask your nurse or doctor about these medications  
  cephalexin 750 mg capsule  
 oxyCODONE-acetaminophen 5-325 mg per tablet Discharge Instructions Instructions Following Ambulatory Surgery Patient: Hamlet Hernandez MRN: [id number]  SSN: [id number] YOB: 1940  Age: 68 y.o. Sex: female Activity · As tolerated, walking encourage, stairs are okay · Avoid strenuous activities - no lifting anything heavier than 15 pounds. · You may shower at home after 48 hours. Diet · Regular diet after nausea from the anesthetic has passed. Pain · Take pain medication as directed by your doctor ·  Call your doctor if pain is NOT relieved by medication Dressing Care · Remove outer dressing (if any) after 48 hours. Leave steri-strips (if any) in place until they fall off. After Anesthesia · For the first 24 hours: DO NOT Drive, Drink alcoholic beverages, or Make important decisions · Be aware of dizziness following anesthesia and while taking pain medication Call your doctor if 
· Excessive bleeding that does not stop after holding mild pressure over the area · Temperature of 101 degrees F or above · Redness,excessive swelling or bruising, and/or green or yellow, smelly discharge from incision · If nausea and vomiting continues Follow-Up Phone Calls · Call the office at [phone] to make your follow-up appointment Appointment date/time: 2 weeks after the surgery. Dr. Ashley Velasquez cell phone number is [phone]. Please call me if you have any concerns or questions. DISCHARGE SUMMARY from Nurse The following personal items are in your possession at time of discharge: 
 
Dental Appliances: None Visual Aid: Glasses PATIENT INSTRUCTIONS: 
 
 
F-face looks uneven A-arms unable to move or move unevenly S-speech slurred or non-existent T-time-call 911 as soon as signs and symptoms begin-DO NOT go Back to bed or wait to see if you get better-TIME IS BRAIN. Warning Signs of HEART ATTACK Call 911 if you have these symptoms: 
? Chest discomfort. Most heart attacks involve discomfort in the center of the chest that lasts more than a few minutes, or that goes away and comes back. It can feel like uncomfortable pressure, squeezing, fullness, or pain. ? Discomfort in other areas of the upper body. Symptoms can include pain or discomfort in one or both arms, the back, neck, jaw, or stomach. ? Shortness of breath with or without chest discomfort. ? Other signs may include breaking out in a cold sweat, nausea, or lightheadedness. Don't wait more than five minutes to call [address]! Fast action can save your life. Calling 911 is almost always the fastest way to get lifesaving treatment. Emergency Medical Services staff can begin treatment when they arrive  up to an hour sooner than if someone gets to the hospital by car. The discharge information has been reviewed with the patient and spouse. The patient and spouse verbalized understanding. Discharge medications reviewed with the patient and spouse and appropriate educational materials and side effects teaching were provided. Patient armband removed and given to patient to take home. Patient was informed of the privacy risks if armband lost or stolen Discharge Orders None Introducing [facility]! [employer] introduces [employer] patient portal. Now you can access parts of your medical record, email your doctor's office, and request medication refills online. 1. In your internet browser, go to [email or web address] 2. Click on the First Time User? Click Here link in the Sign In box. You will see the New Member Sign Up page. 3. Enter your [employer] Access Code exactly as it appears below. You will not need to use this code after youve completed the sign-up process. If you do not sign up before the expiration date, you must request a new code. · [employer] Access Code: [email or web address] Expires: 5/23/2017  9:57 AM 
 
4. Enter the last four digits of your Social Security Number (xxxx) and Date of Birth (mm/dd/yyyy) as indicated and click Submit. You will be taken to the next sign-up page. 5. Create a Big Stage ID. This will be your [employer] login ID and cannot be changed, so think of one that is secure and easy to remember. 6. Create a [employer] password. You can change your password at any time. 7. Enter your Password Reset Question and Answer. This can be used at a later time if you forget your password. 8. Enter your e-mail address. You will receive e-mail notification when new information is available in [address] 9. Click Sign Up. You can now view and download portions of your medical record. 10. Click the Download Summary menu link to download a portable copy of your medical information. If you have questions, please visit the Frequently Asked Questions section of the [employer] website. Remember, [employer] is NOT to be used for urgent needs. For medical emergencies, dial 911. Now available from your iPhone and Android! General Information Please provide this summary of care documentation to your next provider. Patient Signature:  ____________________________________________________________ Date:  ____________________________________________________________  
  
[employer] Provider Signature:  ____________________________________________________________ Date:  ____________________________________________________________

## 2017-05-03 LAB
ATRIAL RATE: 60 BPM
BUN BLD-MCNC: 10 MG/DL (ref 7–18)
CALCULATED P AXIS, ECG09: 21 DEGREES
CALCULATED R AXIS, ECG10: 53 DEGREES
CALCULATED T AXIS, ECG11: 26 DEGREES
CHLORIDE BLD-SCNC: 104 MMOL/L (ref 100–108)
DIAGNOSIS, 93000: NORMAL
GLUCOSE BLD STRIP.AUTO-MCNC: 104 MG/DL (ref 74–106)
HCT VFR BLD CALC: 37 % (ref 36–49)
HGB BLD-MCNC: 12.6 G/DL (ref 12–16)
P-R INTERVAL, ECG05: 226 MS
POTASSIUM BLD-SCNC: 3.7 MMOL/L (ref 3.5–5.5)
Q-T INTERVAL, ECG07: 460 MS
QRS DURATION, ECG06: 98 MS
QTC CALCULATION (BEZET), ECG08: 460 MS
SODIUM BLD-SCNC: 142 MMOL/L (ref 136–145)
VENTRICULAR RATE, ECG03: 60 BPM

## 2017-05-15 ENCOUNTER — OFFICE VISIT (OUTPATIENT)
Dept: SURGERY | Age: 77
End: 2017-05-15

## 2017-05-15 VITALS
DIASTOLIC BLOOD PRESSURE: 67 MMHG | WEIGHT: 157.2 LBS | BODY MASS INDEX: 26.84 KG/M2 | HEIGHT: 64 IN | RESPIRATION RATE: 16 BRPM | OXYGEN SATURATION: 99 % | SYSTOLIC BLOOD PRESSURE: 125 MMHG | HEART RATE: 77 BPM | TEMPERATURE: 97.8 F

## 2017-05-15 DIAGNOSIS — Z09 POSTOPERATIVE EXAMINATION: Primary | ICD-10-CM

## 2017-05-15 NOTE — PATIENT INSTRUCTIONS
If you have any questions or concerns about today's appointment, the verbal and/or written instructions you were given for follow up care, please call our office at [phone].     [facility] - [address]    [phone] office  [phone]

## 2017-05-15 NOTE — PROGRESS NOTES
Patient seen and examined. Doing well. No abdominal pain. Wound is healing well. Abdomen is soft. Pathology showed acute and chronic cholecystitis.

## 2017-05-15 NOTE — LETTER
5/15/2017 9:34 AM 
 
Patient:  Frida Leon YOB: 1940 Date of Visit: 5/15/2017 Kayce Malone MD 
[address] [place] [address],[address] [place] [id number] VIA Facsimile: [phone] Dear Kayce Malone MD, Thank you for referring Ms. Frida Leon to [place] [address] for evaluation and treatment. Below are the relevant portions of my assessment and plan of care. Thank you very much for your referral of Ms. Frida Leon. If you have questions, please do not hesitate to call me. I look forward to following MsShelley Jagruti Meade along with you and will keep you updated as to her progress. Sincerely, Himanshu Jay MD

## 2017-05-15 NOTE — PROGRESS NOTES
Henrique Duran is a 68 y.o. female who presents today for a post-op exam for a robotic single site site cholecystectomy performed on 05/02/17. Patient scores their post-op pain level on a pain scale from 1-10  as a 0 today. 1. Have you been to the ER, urgent care clinic since your last visit? Hospitalized since your last visit? No    2. Have you seen or consulted any other health care providers outside of the [address] [place] [address] since your last visit? Include any pap smears or colon screening.  No

## 2017-11-03 ENCOUNTER — HOSPITAL ENCOUNTER (OUTPATIENT)
Dept: MAMMOGRAPHY | Age: 77
Discharge: HOME | End: 2017-11-03
Attending: FAMILY MEDICINE
Payer: MEDICARE

## 2017-11-03 DIAGNOSIS — Z12.31 VISIT FOR SCREENING MAMMOGRAM: ICD-10-CM

## 2017-11-03 PROCEDURE — 77063 BREAST TOMOSYNTHESIS BI: CPT

## 2018-08-06 ENCOUNTER — HOSPITAL ENCOUNTER (OUTPATIENT)
Dept: GENERAL RADIOLOGY | Age: 78
Discharge: HOME | End: 2018-08-06
Attending: FAMILY MEDICINE
Payer: MEDICARE

## 2018-08-06 DIAGNOSIS — M81.0 POST-MENOPAUSAL OSTEOPOROSIS: ICD-10-CM

## 2018-08-06 DIAGNOSIS — Z13.820 OSTEOPOROSIS SCREENING: ICD-10-CM

## 2018-08-06 PROCEDURE — 77080 DXA BONE DENSITY AXIAL: CPT

## 2018-11-05 ENCOUNTER — HOSPITAL ENCOUNTER (OUTPATIENT)
Dept: MAMMOGRAPHY | Age: 78
Discharge: HOME | End: 2018-11-05
Attending: FAMILY MEDICINE
Payer: MEDICARE

## 2018-11-05 DIAGNOSIS — Z12.31 VISIT FOR SCREENING MAMMOGRAM: ICD-10-CM

## 2018-11-05 PROCEDURE — 77063 BREAST TOMOSYNTHESIS BI: CPT

## 2020-05-22 ENCOUNTER — HOSPITAL ENCOUNTER (OUTPATIENT)
Dept: GENERAL RADIOLOGY | Age: 80
Discharge: HOME | End: 2020-05-22
Payer: MEDICARE

## 2020-05-22 DIAGNOSIS — M25.559 HIP PAIN: ICD-10-CM

## 2020-05-22 PROCEDURE — 73502 X-RAY EXAM HIP UNI 2-3 VIEWS: CPT

## 2020-06-22 ENCOUNTER — HOSPITAL ENCOUNTER (OUTPATIENT)
Dept: MRI IMAGING | Age: 80
Discharge: HOME | End: 2020-06-22
Attending: FAMILY MEDICINE
Payer: MEDICARE

## 2020-06-22 DIAGNOSIS — M51.36 DEGENERATION OF LUMBAR INTERVERTEBRAL DISC: ICD-10-CM

## 2020-06-22 PROCEDURE — 72148 MRI LUMBAR SPINE W/O DYE: CPT

## 2020-06-25 ENCOUNTER — HOSPITAL ENCOUNTER (OUTPATIENT)
Dept: CT IMAGING | Age: 80
Discharge: HOME | End: 2020-06-25
Attending: FAMILY MEDICINE
Payer: MEDICARE

## 2020-06-25 DIAGNOSIS — K68.9: ICD-10-CM

## 2020-06-25 LAB — CREAT UR-MCNC: 0.8 MG/DL (ref 0.6–1.3)

## 2020-06-25 PROCEDURE — 82565 ASSAY OF CREATININE: CPT

## 2020-06-25 PROCEDURE — 74177 CT ABD & PELVIS W/CONTRAST: CPT

## 2020-06-25 PROCEDURE — 74011000255 HC RX REV CODE- 255: Performed by: FAMILY MEDICINE

## 2020-06-25 PROCEDURE — 74011636320 HC RX REV CODE- 636/320: Performed by: FAMILY MEDICINE

## 2020-06-25 RX ORDER — BARIUM SULFATE 20 MG/ML
900 SUSPENSION ORAL
Status: COMPLETED | OUTPATIENT
Start: 2020-06-25 | End: 2020-06-25

## 2020-06-25 RX ADMIN — BARIUM SULFATE 900 ML: 20 SUSPENSION ORAL at 14:06

## 2020-06-25 RX ADMIN — IOPAMIDOL 98 ML: 612 INJECTION, SOLUTION INTRAVENOUS at 14:06

## 2022-03-13 ENCOUNTER — TRANSCRIBE ORDER (OUTPATIENT)
Dept: SCHEDULING | Age: 82
End: 2022-03-13

## 2022-03-13 DIAGNOSIS — M81.0 AGE-RELATED OSTEOPOROSIS WITHOUT CURRENT PATHOLOGICAL FRACTURE: ICD-10-CM

## 2022-03-13 DIAGNOSIS — Z13.820 OSTEOPOROSIS SCREENING: Primary | ICD-10-CM

## 2023-01-30 DIAGNOSIS — Z13.820 OSTEOPOROSIS SCREENING: Primary | ICD-10-CM

## 2023-01-30 DIAGNOSIS — M81.0 AGE-RELATED OSTEOPOROSIS WITHOUT CURRENT PATHOLOGICAL FRACTURE: ICD-10-CM

## 2023-02-03 DIAGNOSIS — Z13.820 OSTEOPOROSIS SCREENING: Primary | ICD-10-CM

## 2023-02-03 DIAGNOSIS — M81.0 AGE-RELATED OSTEOPOROSIS WITHOUT CURRENT PATHOLOGICAL FRACTURE: ICD-10-CM

## (undated) DEVICE — CARTRIDGE CLP LIG HEMLOK GRN --

## (undated) DEVICE — KENDALL SCD EXPRESS SLEEVES, KNEE LENGTH, MEDIUM: Brand: KENDALL SCD

## (undated) DEVICE — SEAL CANN F/12MM 8.5-13MM DISP -- DA VINCI

## (undated) DEVICE — ENDOSCOPE PORT: Brand: SINGLE-SITE

## (undated) DEVICE — DEVON™ KNEE AND BODY STRAP 60" X 3" (1.5 M X 7.6 CM): Brand: DEVON

## (undated) DEVICE — DRAPE,UTILITY,TAPE,15X26,STERILE: Brand: MEDLINE

## (undated) DEVICE — REM POLYHESIVE ADULT PATIENT RETURN ELECTRODE: Brand: VALLEYLAB

## (undated) DEVICE — LIGHT HANDLE: Brand: DEVON

## (undated) DEVICE — PAD,NON-ADHERENT,3X8,STERILE,LF,1/PK: Brand: MEDLINE

## (undated) DEVICE — DRAPE SURG EQUIP W105XH13XL20IN 3 ARM DISPOSABLE DA VINCI S

## (undated) DEVICE — STERILE POLYISOPRENE POWDER-FREE SURGICAL GLOVES: Brand: PROTEXIS

## (undated) DEVICE — SOL IRR NACL 0.9% 500ML POUR --

## (undated) DEVICE — Device

## (undated) DEVICE — INSULATED BLADE ELECTRODE: Brand: EDGE

## (undated) DEVICE — BLADELESS OPTICAL TROCAR WITH FIXATION CANNULA: Brand: VERSAPORT

## (undated) DEVICE — 12 ML SYRINGE,LUER-LOCK TIP: Brand: MONOJECT

## (undated) DEVICE — NEEDLE HYPO 25GA L1.5IN BVL ORIENTED ECLIPSE

## (undated) DEVICE — 3M™ TEGADERM™ TRANSPARENT FILM DRESSING FRAME STYLE, 1624W, 2-3/8 IN X 2-3/4 IN (6 CM X 7 CM), 100/CT 4CT/CASE: Brand: 3M™ TEGADERM™

## (undated) DEVICE — TOWEL SURG W16XL26IN BLU NONFENESTRATED DLX ST 2 PER PK

## (undated) DEVICE — SUTURE MCRYL SZ 4-0 L18IN ABSRB UD L19MM PS-2 3/8 CIR PRIM Y496G

## (undated) DEVICE — SEAL CANN 5MM S/SI DISP -- DA VINCI

## (undated) DEVICE — DISPOSABLE SUCTION/IRRIGATOR TUBE SET WITH TIP: Brand: AHTO

## (undated) DEVICE — GAUZE,SPONGE,2"X2",8PLY,STERILE,LF,2'S: Brand: MEDLINE

## (undated) DEVICE — SUTURE PDS II SZ 1 L27IN ABSRB VLT CT-1 L36MM 1/2 CIR Z341H

## (undated) DEVICE — INTENDED FOR TISSUE SEPARATION, AND OTHER PROCEDURES THAT REQUIRE A SHARP SURGICAL BLADE TO PUNCTURE OR CUT.: Brand: BARD-PARKER ® CARBON RIB-BACK BLADES

## (undated) DEVICE — SOL ANTI-FOG 6ML MEDC -- MEDICHOICE - CONVERT TO 358427